# Patient Record
Sex: MALE | URBAN - METROPOLITAN AREA
[De-identification: names, ages, dates, MRNs, and addresses within clinical notes are randomized per-mention and may not be internally consistent; named-entity substitution may affect disease eponyms.]

---

## 2018-02-14 ENCOUNTER — APPOINTMENT (RX ONLY)
Dept: URBAN - METROPOLITAN AREA CLINIC 95 | Facility: CLINIC | Age: 38
Setting detail: DERMATOLOGY
End: 2018-02-14

## 2018-02-14 DIAGNOSIS — L82.0 INFLAMED SEBORRHEIC KERATOSIS: ICD-10-CM

## 2018-02-14 PROBLEM — D48.5 NEOPLASM OF UNCERTAIN BEHAVIOR OF SKIN: Status: ACTIVE | Noted: 2018-02-14

## 2018-02-14 PROCEDURE — ? ADDITIONAL NOTES

## 2018-02-14 PROCEDURE — 99202 OFFICE O/P NEW SF 15 MIN: CPT

## 2018-02-14 PROCEDURE — ? DEFER

## 2018-02-14 ASSESSMENT — LOCATION ZONE DERM: LOCATION ZONE: FACE

## 2018-02-14 ASSESSMENT — LOCATION SIMPLE DESCRIPTION DERM: LOCATION SIMPLE: RIGHT FOREHEAD

## 2018-02-14 ASSESSMENT — LOCATION DETAILED DESCRIPTION DERM: LOCATION DETAILED: RIGHT INFERIOR FOREHEAD

## 2018-02-14 NOTE — HPI: SKIN LESION
Is This A New Presentation, Or A Follow-Up?: Mole
How Severe Is Your Skin Lesion?: moderate
Has Your Skin Lesion Been Treated?: not been treated
Additional History: Used coconut oil on the lesion

## 2018-02-14 NOTE — PROCEDURE: ADDITIONAL NOTES
Additional Notes: Patient was notified of his deductible and our payment requirements prior to Dr. Peng evaluation regarding removal of lesion on the right temple. After Dr. Kirkland evaluated lesion and patient agreed to go through with the biopsy, Dr. Kirkland left the room and I was preparing patient for a shave biopsy and again restated the deductible amount. When given consent form to sign the patient seemed upset about the cost “not being billed to insurance so he can set up payment with them rather than paying the dr office.” I discussed with patient that this was our office policy to collect at the time of service and patient stated “I don’t have any money” and then proceeded to walk out of the room and front door without checking out. I was informed of this after the patient had left.